# Patient Record
Sex: FEMALE | Race: OTHER
[De-identification: names, ages, dates, MRNs, and addresses within clinical notes are randomized per-mention and may not be internally consistent; named-entity substitution may affect disease eponyms.]

---

## 2017-04-18 ENCOUNTER — TRANSCRIPTION ENCOUNTER (OUTPATIENT)
Age: 37
End: 2017-04-18

## 2017-10-19 ENCOUNTER — EMERGENCY (EMERGENCY)
Facility: HOSPITAL | Age: 37
LOS: 1 days | Discharge: HOME | End: 2017-10-19
Attending: EMERGENCY MEDICINE
Payer: MEDICAID

## 2017-10-19 VITALS
RESPIRATION RATE: 16 BRPM | OXYGEN SATURATION: 96 % | HEART RATE: 71 BPM | DIASTOLIC BLOOD PRESSURE: 80 MMHG | WEIGHT: 151.9 LBS | HEIGHT: 62 IN | SYSTOLIC BLOOD PRESSURE: 150 MMHG | TEMPERATURE: 98 F

## 2017-10-19 DIAGNOSIS — Z88.0 ALLERGY STATUS TO PENICILLIN: ICD-10-CM

## 2017-10-19 DIAGNOSIS — T31.0 BURNS INVOLVING LESS THAN 10% OF BODY SURFACE: ICD-10-CM
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
## 2017-10-19 DIAGNOSIS — T22.112A BURN OF FIRST DEGREE OF LEFT FOREARM, INITIAL ENCOUNTER: ICD-10-CM

## 2017-10-19 PROCEDURE — 99284 EMERGENCY DEPT VISIT MOD MDM: CPT

## 2017-10-19 PROCEDURE — 99282 EMERGENCY DEPT VISIT SF MDM: CPT

## 2017-10-19 NOTE — ED PROVIDER NOTE - OBJECTIVE STATEMENT
Pt. present to ED c/o burn to her left forearm and upper arm about 1 hour ago. Pt. states that cooking oil splashed on her arm when she was cooking. This occurred about 1 hour PTA. NO other injuries. Pt. has mild pain to her left forearm. NO blisters.

## 2017-10-19 NOTE — ED ADULT NURSE NOTE - OBJECTIVE STATEMENT
pt from home c/o of Lt forearm splashed by oil while cooking Lt forearm scattered redness noted no blisters skin dry and intact

## 2017-10-19 NOTE — ED PROVIDER NOTE - SKIN LOCATION #1
forearm/Multiple erythematous patches and papular lesions noted to Left forearm and some smaller raised lesions to Left upper arm. No blisters noted.  NO skin tenting.

## 2017-11-02 ENCOUNTER — EMERGENCY (EMERGENCY)
Facility: HOSPITAL | Age: 37
LOS: 1 days | Discharge: HOME | End: 2017-11-02
Attending: EMERGENCY MEDICINE | Admitting: EMERGENCY MEDICINE
Payer: MEDICAID

## 2017-11-02 VITALS
HEART RATE: 82 BPM | OXYGEN SATURATION: 100 % | TEMPERATURE: 98 F | RESPIRATION RATE: 16 BRPM | DIASTOLIC BLOOD PRESSURE: 82 MMHG | SYSTOLIC BLOOD PRESSURE: 147 MMHG

## 2017-11-02 PROCEDURE — 99284 EMERGENCY DEPT VISIT MOD MDM: CPT

## 2017-11-02 NOTE — ED ADULT TRIAGE NOTE - CHIEF COMPLAINT QUOTE
Sent by PMD for evaluation after pt began having abd pain and BRBPR since yesterday. Pt states episodes occur every 3 hours, with clots. Denies blood thinner use. Also states vomiting yesterday but denies any today. Denies medical hx. Denies weakness or dizziness.

## 2017-11-03 VITALS
TEMPERATURE: 98 F | OXYGEN SATURATION: 100 % | DIASTOLIC BLOOD PRESSURE: 79 MMHG | HEART RATE: 79 BPM | SYSTOLIC BLOOD PRESSURE: 134 MMHG | RESPIRATION RATE: 16 BRPM

## 2017-11-03 LAB
ALBUMIN SERPL ELPH-MCNC: 4.4 G/DL — SIGNIFICANT CHANGE UP (ref 3.3–5)
ALP SERPL-CCNC: 72 U/L — SIGNIFICANT CHANGE UP (ref 40–120)
ALT FLD-CCNC: 10 U/L — SIGNIFICANT CHANGE UP (ref 4–33)
APTT BLD: 31.4 SEC — SIGNIFICANT CHANGE UP (ref 27.5–37.4)
AST SERPL-CCNC: 19 U/L — SIGNIFICANT CHANGE UP (ref 4–32)
BASOPHILS # BLD AUTO: 0.05 K/UL — SIGNIFICANT CHANGE UP (ref 0–0.2)
BASOPHILS NFR BLD AUTO: 0.8 % — SIGNIFICANT CHANGE UP (ref 0–2)
BILIRUB SERPL-MCNC: 0.5 MG/DL — SIGNIFICANT CHANGE UP (ref 0.2–1.2)
BLD GP AB SCN SERPL QL: NEGATIVE — SIGNIFICANT CHANGE UP
BUN SERPL-MCNC: 9 MG/DL — SIGNIFICANT CHANGE UP (ref 7–23)
CALCIUM SERPL-MCNC: 9.3 MG/DL — SIGNIFICANT CHANGE UP (ref 8.4–10.5)
CHLORIDE SERPL-SCNC: 102 MMOL/L — SIGNIFICANT CHANGE UP (ref 98–107)
CO2 SERPL-SCNC: 28 MMOL/L — SIGNIFICANT CHANGE UP (ref 22–31)
CREAT SERPL-MCNC: 0.74 MG/DL — SIGNIFICANT CHANGE UP (ref 0.5–1.3)
EOSINOPHIL # BLD AUTO: 0.09 K/UL — SIGNIFICANT CHANGE UP (ref 0–0.5)
EOSINOPHIL NFR BLD AUTO: 1.4 % — SIGNIFICANT CHANGE UP (ref 0–6)
GLUCOSE SERPL-MCNC: 100 MG/DL — HIGH (ref 70–99)
HCG UR-SCNC: NEGATIVE — SIGNIFICANT CHANGE UP
HCT VFR BLD CALC: 40.7 % — SIGNIFICANT CHANGE UP (ref 34.5–45)
HGB BLD-MCNC: 13.2 G/DL — SIGNIFICANT CHANGE UP (ref 11.5–15.5)
IMM GRANULOCYTES # BLD AUTO: 0.01 # — SIGNIFICANT CHANGE UP
IMM GRANULOCYTES NFR BLD AUTO: 0.2 % — SIGNIFICANT CHANGE UP (ref 0–1.5)
INR BLD: 1.05 — SIGNIFICANT CHANGE UP (ref 0.88–1.17)
LYMPHOCYTES # BLD AUTO: 2.62 K/UL — SIGNIFICANT CHANGE UP (ref 1–3.3)
LYMPHOCYTES # BLD AUTO: 40.4 % — SIGNIFICANT CHANGE UP (ref 13–44)
MCHC RBC-ENTMCNC: 31.1 PG — SIGNIFICANT CHANGE UP (ref 27–34)
MCHC RBC-ENTMCNC: 32.4 % — SIGNIFICANT CHANGE UP (ref 32–36)
MCV RBC AUTO: 95.8 FL — SIGNIFICANT CHANGE UP (ref 80–100)
MONOCYTES # BLD AUTO: 0.74 K/UL — SIGNIFICANT CHANGE UP (ref 0–0.9)
MONOCYTES NFR BLD AUTO: 11.4 % — SIGNIFICANT CHANGE UP (ref 2–14)
NEUTROPHILS # BLD AUTO: 2.97 K/UL — SIGNIFICANT CHANGE UP (ref 1.8–7.4)
NEUTROPHILS NFR BLD AUTO: 45.8 % — SIGNIFICANT CHANGE UP (ref 43–77)
NRBC # FLD: 0 — SIGNIFICANT CHANGE UP
PLATELET # BLD AUTO: 286 K/UL — SIGNIFICANT CHANGE UP (ref 150–400)
PMV BLD: 9.4 FL — SIGNIFICANT CHANGE UP (ref 7–13)
POTASSIUM SERPL-MCNC: 4 MMOL/L — SIGNIFICANT CHANGE UP (ref 3.5–5.3)
POTASSIUM SERPL-SCNC: 4 MMOL/L — SIGNIFICANT CHANGE UP (ref 3.5–5.3)
PROT SERPL-MCNC: 8 G/DL — SIGNIFICANT CHANGE UP (ref 6–8.3)
PROTHROM AB SERPL-ACNC: 11.8 SEC — SIGNIFICANT CHANGE UP (ref 9.8–13.1)
RBC # BLD: 4.25 M/UL — SIGNIFICANT CHANGE UP (ref 3.8–5.2)
RBC # FLD: 12.2 % — SIGNIFICANT CHANGE UP (ref 10.3–14.5)
RH IG SCN BLD-IMP: NEGATIVE — SIGNIFICANT CHANGE UP
SODIUM SERPL-SCNC: 141 MMOL/L — SIGNIFICANT CHANGE UP (ref 135–145)
SP GR UR: 1.03 — SIGNIFICANT CHANGE UP (ref 1–1.03)
WBC # BLD: 6.48 K/UL — SIGNIFICANT CHANGE UP (ref 3.8–10.5)
WBC # FLD AUTO: 6.48 K/UL — SIGNIFICANT CHANGE UP (ref 3.8–10.5)

## 2017-11-03 NOTE — ED PROVIDER NOTE - ATTENDING CONTRIBUTION TO CARE
I was physically present for the E/M service provided. I agree with above history, physical, and plan which I have reviewed and edited where appropriate. I was physically present for the key portions of the service provided.    Patient is a 37 year old female with PMHx of GERD p/w lower abdominal pain x1 day and one episode of bloody formed stool PTA. +Nausea. Afebrile. I was physically present for the E/M service provided. I agree with above history, physical, and plan which I have reviewed and edited where appropriate. I was physically present for the key portions of the service provided.    Patient is a 37 year old female with PMHx of GERD p/w lower abdominal pain x1 day and one episode of bloody formed stool PTA. +Nausea. Afebrile. VS Stable in ED. H&H WNL. No blood on rectal exam. f/u GI outpt.

## 2017-11-03 NOTE — ED PROVIDER NOTE - OBJECTIVE STATEMENT
Patient is a 37 year old female with PMHx of GERD, she has been having lower abdominal pain since yesterday and 1 episode of finding blood in her toilet bowel tonight around midnight. She has a photograph on her phone that shows bright red blood in her toilet bowel. She denies any dark stool or emesis. She also denies prior history of anything like this, She has associated nausea with 2 episodes of vomiting yesterday. She has been feeling weak but she attributes that to decreased PO intake from the nausea. She denies any CP, SOB, fatigue or LOC. She does admit to some mild pain with defecations, she says that she feels a pain or burning after she finishes.

## 2017-11-03 NOTE — ED PROVIDER NOTE - MEDICAL DECISION MAKING DETAILS
Patient is a healthy 37 year old female who presents with 1 episode of rectal bleeding associated with some lower abdominal pain. DDx: Diverticulitis, IBS, inflammatory bowel disorder, hemorrhoids, rectal fissure.

## 2017-11-03 NOTE — ED ADULT NURSE NOTE - OBJECTIVE STATEMENT
Ambulatory complaining of BRBPR since yesterday afternoon with intermittent abdominal pain and episodes of vomiting yesterday. Denies N/V at present. States she uses the bathroom every couple of hours, the maximum time between is 3 hours. Patient is calm and cooperative. Denies fevers/chills, travel, eating new foods, change in diet, anticoagulation. Waiting to be seen by provider. Safety maintained, needs attended, will continue to monitor.

## 2018-08-13 ENCOUNTER — APPOINTMENT (OUTPATIENT)
Dept: ORTHOPEDIC SURGERY | Facility: CLINIC | Age: 38
End: 2018-08-13
Payer: MEDICAID

## 2018-08-13 VITALS — HEART RATE: 84 BPM | SYSTOLIC BLOOD PRESSURE: 130 MMHG | DIASTOLIC BLOOD PRESSURE: 84 MMHG

## 2018-08-13 DIAGNOSIS — M54.5 LOW BACK PAIN: ICD-10-CM

## 2018-08-13 DIAGNOSIS — M54.16 RADICULOPATHY, LUMBAR REGION: ICD-10-CM

## 2018-08-13 PROCEDURE — 72100 X-RAY EXAM L-S SPINE 2/3 VWS: CPT

## 2018-08-13 PROCEDURE — 99204 OFFICE O/P NEW MOD 45 MIN: CPT

## 2018-08-13 RX ORDER — METHYLPREDNISOLONE 4 MG/1
4 TABLET ORAL
Qty: 1 | Refills: 1 | Status: ACTIVE | COMMUNITY
Start: 2018-08-13 | End: 1900-01-01

## 2018-08-21 ENCOUNTER — APPOINTMENT (OUTPATIENT)
Dept: ENDOCRINOLOGY | Facility: CLINIC | Age: 38
End: 2018-08-21
Payer: MEDICAID

## 2018-08-21 VITALS
OXYGEN SATURATION: 100 % | WEIGHT: 160 LBS | HEIGHT: 62 IN | SYSTOLIC BLOOD PRESSURE: 125 MMHG | DIASTOLIC BLOOD PRESSURE: 68 MMHG | TEMPERATURE: 98.7 F | HEART RATE: 79 BPM | BODY MASS INDEX: 29.44 KG/M2 | RESPIRATION RATE: 16 BRPM

## 2018-08-21 DIAGNOSIS — R73.03 PREDIABETES.: ICD-10-CM

## 2018-08-21 DIAGNOSIS — E78.5 HYPERLIPIDEMIA, UNSPECIFIED: ICD-10-CM

## 2018-08-21 DIAGNOSIS — E66.9 OBESITY, UNSPECIFIED: ICD-10-CM

## 2018-08-21 PROCEDURE — 99214 OFFICE O/P EST MOD 30 MIN: CPT

## 2018-09-07 ENCOUNTER — APPOINTMENT (OUTPATIENT)
Dept: NEUROLOGY | Facility: CLINIC | Age: 38
End: 2018-09-07

## 2018-09-10 ENCOUNTER — APPOINTMENT (OUTPATIENT)
Dept: NUTRITION | Facility: CLINIC | Age: 38
End: 2018-09-10
Payer: MEDICAID

## 2018-09-10 PROCEDURE — 97802 MEDICAL NUTRITION INDIV IN: CPT

## 2019-05-30 ENCOUNTER — EMERGENCY (EMERGENCY)
Facility: HOSPITAL | Age: 39
LOS: 1 days | Discharge: HOME | End: 2019-05-30
Attending: EMERGENCY MEDICINE | Admitting: EMERGENCY MEDICINE
Payer: MEDICAID

## 2019-05-30 VITALS
TEMPERATURE: 100 F | OXYGEN SATURATION: 99 % | HEART RATE: 78 BPM | RESPIRATION RATE: 18 BRPM | DIASTOLIC BLOOD PRESSURE: 50 MMHG | SYSTOLIC BLOOD PRESSURE: 102 MMHG

## 2019-05-30 VITALS
SYSTOLIC BLOOD PRESSURE: 146 MMHG | TEMPERATURE: 98 F | OXYGEN SATURATION: 100 % | RESPIRATION RATE: 16 BRPM | DIASTOLIC BLOOD PRESSURE: 81 MMHG | HEART RATE: 80 BPM

## 2019-05-30 PROCEDURE — 99284 EMERGENCY DEPT VISIT MOD MDM: CPT | Mod: 25

## 2019-05-30 RX ORDER — KETOROLAC TROMETHAMINE 30 MG/ML
15 SYRINGE (ML) INJECTION ONCE
Refills: 0 | Status: DISCONTINUED | OUTPATIENT
Start: 2019-05-30 | End: 2019-05-30

## 2019-05-30 RX ORDER — SODIUM CHLORIDE 9 MG/ML
1000 INJECTION INTRAMUSCULAR; INTRAVENOUS; SUBCUTANEOUS ONCE
Refills: 0 | Status: COMPLETED | OUTPATIENT
Start: 2019-05-30 | End: 2019-05-30

## 2019-05-30 RX ORDER — ACETAMINOPHEN 500 MG
975 TABLET ORAL ONCE
Refills: 0 | Status: COMPLETED | OUTPATIENT
Start: 2019-05-30 | End: 2019-05-30

## 2019-05-30 RX ORDER — METOCLOPRAMIDE HCL 10 MG
10 TABLET ORAL ONCE
Refills: 0 | Status: COMPLETED | OUTPATIENT
Start: 2019-05-30 | End: 2019-05-30

## 2019-05-30 RX ADMIN — Medication 975 MILLIGRAM(S): at 02:55

## 2019-05-30 RX ADMIN — Medication 15 MILLIGRAM(S): at 05:13

## 2019-05-30 RX ADMIN — SODIUM CHLORIDE 2000 MILLILITER(S): 9 INJECTION INTRAMUSCULAR; INTRAVENOUS; SUBCUTANEOUS at 02:55

## 2019-05-30 RX ADMIN — Medication 10 MILLIGRAM(S): at 02:55

## 2019-05-30 RX ADMIN — Medication 15 MILLIGRAM(S): at 04:34

## 2019-05-30 NOTE — ED ADULT TRIAGE NOTE - CHIEF COMPLAINT QUOTE
Pt c/o headache since yesterday morning. Also c/o dizziness. Denies n/v, blurry vision. Took motrin 4 hrs ago with no relief. PMHx htn. Pt c/o headache since yesterday morning. Also c/o dizziness PTA. Denies n/v, blurry vision. Took motrin 4 hrs ago with no relief. FS 83. PMHx htn. Pt c/o headache since yesterday morning. Also c/o dizziness PTA. Denies n/v, blurry vision. Took motrin 4 hrs ago with no relief. FS 83. JACLYN called for stroke eval, no stroke code called. PMHx htn.

## 2019-05-30 NOTE — ED PROVIDER NOTE - PHYSICAL EXAMINATION
no LE edema, normal equal distal pulses.   PERRL, EOMI, no nystagmus. CN intact. Strength 5/5. Steady unassisted gait. No pronator drift. Sensation intact. No carotid bruits.

## 2019-05-30 NOTE — ED PROVIDER NOTE - ATTENDING CONTRIBUTION TO CARE
38F PMH HTN p/w bitemporal HA, x1d, intermittent, similar to multiple prior, no other systemic symptoms. +recent MRI head reportedly normal. Vitals wnl, exam as above.   ddx: Clinically benign HA.  Symptom control, reaswess.

## 2019-05-30 NOTE — ED ADULT NURSE REASSESSMENT NOTE - NS ED NURSE REASSESS COMMENT FT1
Pt denies any complaints at this time. respirations are equal and unlabored, no distress noted. Vitals as noted. IV removed, discharge paperwork given. Pt stable, ambulatory out of the ED with family.

## 2019-05-30 NOTE — ED ADULT NURSE NOTE - NSIMPLEMENTINTERV_GEN_ALL_ED
Implemented All Universal Safety Interventions:  [place] to call system. Call bell, personal items and telephone within reach. Instruct patient to call for assistance. Room bathroom lighting operational. Non-slip footwear when patient is off stretcher. Physically safe environment: no spills, clutter or unnecessary equipment. Stretcher in lowest position, wheels locked, appropriate side rails in place.

## 2019-05-30 NOTE — ED ADULT NURSE NOTE - OBJECTIVE STATEMENT
Pt received in room 29, AA&OX4. Pt c/o headache since 7Am this morning, pt reports taking 1 Ibuprofen at home with no relief. Pt reports dizziness early that is now resolved. Pt denies vision changes, blurry vision, SOB, CP, nausea, vomiting, diarrhea at this time. MD at bedside for evaluation, awaiting orders. Will continue to monitor.

## 2019-05-30 NOTE — ED PROVIDER NOTE - PROGRESS NOTE DETAILS
Raysa: Patient resting comfortably stating headache much improved. Currently 3/10 [place]: Feeling much better, comfortable for dc outpt pmd/neuro f/u.

## 2019-05-30 NOTE — ED ADULT NURSE NOTE - CHIEF COMPLAINT QUOTE
Pt c/o headache since yesterday morning. Also c/o dizziness PTA. Denies n/v, blurry vision. Took motrin 4 hrs ago with no relief. FS 83. JACLYN called for stroke eval, no stroke code called. PMHx htn.

## 2019-05-30 NOTE — ED PROVIDER NOTE - OBJECTIVE STATEMENT
38F with pmh HTN presenting with bilateral band like headache since this morning 7/10 and worsening throughout day. States that she has been getting similar headaches chronically, seen by neurologist and received MR brain 1 month ago which was normal per patient. Tried taking motrin with no relief. No fever, chills, cp, sob, n/v/d, dysuria, cough, recent illness, changes in vision 38F with pmh HTN presenting with bilateral band like headache since this morning 7/10 and worsening throughout day. States that she has been getting similar headaches chronically, seen by neurologist and received MR brain 1 month ago which was normal per patient. Tried taking motrin with no relief. No fever, chills, cp, sob, n/v/d, dysuria, cough, recent illness, changes in vision  [place]: 38F PMH HTN p/w bitemporal HA, x1d, intermittent. 1tab motrin ~4hrs PTA w/ minimal relief. HAs hx of multiple similar Has, was evaluated by neuro and had reportedly normal MRI. While getting out of car to ED had very brief lightheadness w/ standing that resolved after a few seconds, no other dizziness. Denies vision changes, weakness/numbness, rashes, tinnitus, hearing changes, URI symptoms, f/c, SOB/CP, NVD, abd pain, urinary complaints, black/bloody stool.

## 2019-05-30 NOTE — ED PROVIDER NOTE - CLINICAL SUMMARY MEDICAL DECISION MAKING FREE TEXT BOX
Patient presenting with headache. Likely benign variant, possible tension. Hx of similar chronic headaches with recent eval by neuro outpatient with negative MR. Neurologically intact, Will treat symptomatically

## 2022-08-04 NOTE — ED ADULT NURSE NOTE - SKIN CAPILLARY REFILL
PAST MEDICAL HISTORY:  GERD (gastroesophageal reflux disease)     History of Transient Ischemic Attack 2 times, > 20 years ago    Hypercholesterolemia patient reports resloved    OA (osteoarthritis)     Obese     OLEGARIO (obstructive sleep apnea) does not use device    Unspecified B-cell lymphoma, unspecified site     
2 seconds or less

## 2022-12-04 NOTE — ED ADULT NURSE NOTE - PAIN RATING/NUMBER SCALE (0-10): ACTIVITY
Follow-up with orthopedics or your primary care doctor. They will be able to obtain an outpatient MRI if needed. Return to the ED for worsening symptoms or for other concerns.
5

## 2023-08-03 ENCOUNTER — APPOINTMENT (OUTPATIENT)
Dept: PAIN MANAGEMENT | Facility: CLINIC | Age: 43
End: 2023-08-03
Payer: MEDICAID

## 2023-08-03 PROCEDURE — 99204 OFFICE O/P NEW MOD 45 MIN: CPT

## 2023-08-03 RX ORDER — MELOXICAM 15 MG/1
15 TABLET ORAL
Qty: 30 | Refills: 2 | Status: ACTIVE | COMMUNITY
Start: 2023-08-03 | End: 1900-01-01

## 2023-08-03 NOTE — DATA REVIEWED
[MRI] : MRI [Lumbar Spine] : lumbar spine [Report was reviewed and noted in the chart] : The report was reviewed and noted in the chart [I independently reviewed and interpreted images and report] : I independently reviewed and interpreted images and report [FreeTextEntry1] : 4/25/2019: Mild degenerative disc changes at L2-L3 through L4-L5, including L3-L4 right foraminal annular fissure. No significant spinal canal or foraminal stenosis.

## 2023-08-03 NOTE — DISCUSSION/SUMMARY
[de-identified] : MARGARITA LACY is a 42 year year-old woman presenting for a NPV for a history of chronic low back pain.  Prior treatment: steroid injection, type uncertain acetaminophen  physical therapy acupuncture  Plan: 1) MRI lumbar spine; patient with RLE radiculopathy that is new, no findings on old MRI to explain symptoms 2) Start physical therapy 3) Trial meloxicam 15mg daily 4) RTC 2 weeks to review MRI
alone

## 2023-08-03 NOTE — PHYSICAL EXAM
[de-identified] : Gen: NAD, patient is obese Head: NC/AT Eyes: no glasses, no scleral icterus ENT: mucous membranes moist CV: RRR, S1 S2, no mrg Lungs: CTAB, nonlabored breathing Abd: soft, NT/ND Ext: full ROM in all extremities, no peripheral edema Back: +TTP in the bilateral low lumbar facet region; +SLR on the right, limited extension 2/2 pain Neuro: CN intact LEs +5 L +4 R hip flexion +5 L +4 R leg extension +5 L +5 R leg flexion +5 L +5 R foot dorsiflexion +5 L +5 R foot plantarflexion +5 L +5 R EHL extension Psych: normal affect Skin: no visible lesions

## 2023-08-03 NOTE — HISTORY OF PRESENT ILLNESS
[FreeTextEntry1] : 8/3/2023: MARGARITA LACY is a 42 year year-old woman presenting for a NPV for a history of chronic low back pain. The patient states that she has been having low back pain since 2015. She had a since steroid injection in the past, but is unsure as to which type. At this point, the patient endorses pain in the low lumbosacral region that radiates to the right anterolateral thigh and anterior leg. She notes occasional numbness and tingling and has difficulty with walking. No bowel or bladder incontinence or saddle anesthesia. She takes acetaminophen prn. Pain worse with standing and walking.    The patient states that average pain over the past week was 9/10 in severity.

## 2023-08-30 ENCOUNTER — APPOINTMENT (OUTPATIENT)
Dept: MRI IMAGING | Facility: CLINIC | Age: 43
End: 2023-08-30

## 2023-09-15 ENCOUNTER — APPOINTMENT (OUTPATIENT)
Dept: MRI IMAGING | Facility: CLINIC | Age: 43
End: 2023-09-15

## 2023-09-18 ENCOUNTER — APPOINTMENT (OUTPATIENT)
Dept: PAIN MANAGEMENT | Facility: CLINIC | Age: 43
End: 2023-09-18

## 2023-10-02 ENCOUNTER — APPOINTMENT (OUTPATIENT)
Dept: PAIN MANAGEMENT | Facility: CLINIC | Age: 43
End: 2023-10-02

## 2024-07-22 ENCOUNTER — APPOINTMENT (OUTPATIENT)
Dept: PAIN MANAGEMENT | Facility: CLINIC | Age: 44
End: 2024-07-22
Payer: MEDICAID

## 2024-07-22 VITALS — HEIGHT: 62 IN | WEIGHT: 175 LBS | BODY MASS INDEX: 32.2 KG/M2

## 2024-07-22 PROBLEM — M54.50 LUMBAGO: Status: ACTIVE | Noted: 2018-08-13

## 2024-07-22 PROCEDURE — 99214 OFFICE O/P EST MOD 30 MIN: CPT

## 2024-07-22 NOTE — HISTORY OF PRESENT ILLNESS
[Lower back] : lower back [Buttock] : buttock [Right Leg] : right leg [7] : 7 [6] : 6 [Dull/Aching] : dull/aching [Radiating] : radiating [Sharp] : sharp [Stabbing] : stabbing [Tingling] : tingling [Intermittent] : intermittent [Household chores] : household chores [Leisure] : leisure [Nothing helps with pain getting better] : Nothing helps with pain getting better [Sitting] : sitting [Standing] : standing [Walking] : walking [Bending forward] : bending forward [Physical therapy] : physical therapy [FreeTextEntry1] : 7/22/2024 MARGARITA LACY is a 43 year-old woman presenting for a RPV for a history of chronic low back pain. The patient notes ongoing pain across the low back. She notes that she has been having radiation of pain to the right posterolateral thigh and lateral leg to the dorsal foot on the right side. Notes numbness and weakness with right hip flexion and right foot dorsiflexion. Has occasional difficulty with ambulation due to weakness.  The patient states that average pain over the past week was 7/10 in severity. Mood: Patient notes some depressed mood. No anxiety.  Sleep: Patient notes occasional difficulty with sleep 2/2 pain.   8/3/2023: MARGARITA LACY is a 42 year year-old woman presenting for a NPV for a history of chronic low back pain. The patient states that she has been having low back pain since 2015. She had a since steroid injection in the past, but is unsure as to which type. At this point, the patient endorses pain in the low lumbosacral region that radiates to the right anterolateral thigh and anterior leg. She notes occasional numbness and tingling and has difficulty with walking. No bowel or bladder incontinence or saddle anesthesia. She takes acetaminophen prn. Pain worse with standing and walking.  The patient states that average pain over the past week was 9/10 in severity. [] : no [FreeTextEntry6] : numbness and tingling in R feet  [FreeTextEntry7] : R leg  [de-identified] : lifting  [de-identified] : x-ray and MRI  ([facility])

## 2024-07-22 NOTE — PHYSICAL EXAM
[de-identified] : Gen: NAD, patient is obese Head: NC/AT Eyes: no glasses, no scleral icterus ENT: mucous membranes moist CV: RRR, S1 S2, no mrg Lungs: CTAB, nonlabored breathing Abd: soft, NT/ND Ext: full ROM in all extremities, no peripheral edema Back: +TTP in the bilateral low lumbar facet region; +SLR on the right, limited extension 2/2 pain Neuro: CN intact; decreased sensation over L5 on the right LEs +5 L +4 R hip flexion +5 L +4 R leg extension +5 L +5 R leg flexion +5 L +4 R foot dorsiflexion +5 L +5 R foot plantarflexion +5 L +5 R EHL extension Psych: normal affect Skin: no visible lesions

## 2024-07-22 NOTE — DISCUSSION/SUMMARY
[de-identified] : MARGARITA LACY is a 43 year year-old woman presenting for a RPV for a history of chronic low back pain.  Prior treatment: Physical therapy steroid injection, type uncertain acetaminophen  physical therapy acupuncture  Plan: 1) Obtain MRI lumbar spine w/o contrast 2) Restart physical therapy--script provided 3) Consider RIGHT-sided TFESI based on the imaging above 4) Continue meloxicam 15mg daily; denies AEs 5) RTC 2 weeks to review MRI

## 2024-07-29 ENCOUNTER — APPOINTMENT (OUTPATIENT)
Dept: MRI IMAGING | Facility: CLINIC | Age: 44
End: 2024-07-29
Payer: MEDICAID

## 2024-07-29 PROCEDURE — 72148 MRI LUMBAR SPINE W/O DYE: CPT

## 2024-08-05 ENCOUNTER — APPOINTMENT (OUTPATIENT)
Dept: PAIN MANAGEMENT | Facility: CLINIC | Age: 44
End: 2024-08-05

## 2024-08-05 PROCEDURE — 99214 OFFICE O/P EST MOD 30 MIN: CPT

## 2024-08-05 NOTE — HISTORY OF PRESENT ILLNESS
[Lower back] : lower back [Buttock] : buttock [Right Leg] : right leg [6] : 6 [Dull/Aching] : dull/aching [Radiating] : radiating [Sharp] : sharp [Stabbing] : stabbing [Tingling] : tingling [Intermittent] : intermittent [Household chores] : household chores [Leisure] : leisure [Nothing helps with pain getting better] : Nothing helps with pain getting better [Sitting] : sitting [Standing] : standing [Walking] : walking [Bending forward] : bending forward [Physical therapy] : physical therapy [5] : 5 [7] : 7 [FreeTextEntry1] : 8/5/2024 MARGARITA LACY is a 43 year-old woman presenting for a RPV for a history of chronic low back pain. The patient notes that she continues to have an aching pain in the right low back with radiation to the right posterolateral thigh and lateral leg. She notes intermittent tingling and numbness and some RLE weakness. The patient notes having ongoing difficulty with ambulation.  The patient states that average pain over the past week was 6/10 in severity. Mood: Patient notes some depressed mood. No anxiety.  Sleep: Patient notes occasional difficulty with sleep 2/2 pain.   7/22/2024 MARGARITA LACY is a 43 year-old woman presenting for a RPV for a history of chronic low back pain. The patient notes ongoing pain across the low back. She notes that she has been having radiation of pain to the right posterolateral thigh and lateral leg to the dorsal foot on the right side. Notes numbness and weakness with right hip flexion and right foot dorsiflexion. Has occasional difficulty with ambulation due to weakness.  The patient states that average pain over the past week was 7/10 in severity. Mood: Patient notes some depressed mood. No anxiety.  Sleep: Patient notes occasional difficulty with sleep 2/2 pain.   8/3/2023: MARGARITA LACY is a 42 year year-old woman presenting for a NPV for a history of chronic low back pain. The patient states that she has been having low back pain since 2015. She had a since steroid injection in the past, but is unsure as to which type. At this point, the patient endorses pain in the low lumbosacral region that radiates to the right anterolateral thigh and anterior leg. She notes occasional numbness and tingling and has difficulty with walking. No bowel or bladder incontinence or saddle anesthesia. She takes acetaminophen prn. Pain worse with standing and walking.  The patient states that average pain over the past week was 9/10 in severity.  [] : no [FreeTextEntry6] : numbness and tingling in R feet  [FreeTextEntry7] : R leg  [de-identified] : lifting  [de-identified] : x-ray and MRI  ([facility])

## 2024-08-05 NOTE — PHYSICAL EXAM
[de-identified] : Gen: NAD, patient is obese Head: NC/AT Eyes: no glasses, no scleral icterus ENT: mucous membranes moist CV: RRR, S1 S2, no mrg Lungs: CTAB, nonlabored breathing Abd: soft, NT/ND Ext: full ROM in all extremities, no peripheral edema Back: +TTP in the bilateral low lumbar facet region; +SLR on the right, limited extension 2/2 pain Neuro: CN intact; decreased sensation over L5 on the right LEs +5 L +4 R hip flexion +5 L +4 R leg extension +5 L +5 R leg flexion +5 L +4 R foot dorsiflexion +5 L +5 R foot plantarflexion +5 L +5 R EHL extension Psych: normal affect Skin: no visible lesions

## 2024-08-05 NOTE — PHYSICAL EXAM
[de-identified] : Gen: NAD, patient is obese Head: NC/AT Eyes: no glasses, no scleral icterus ENT: mucous membranes moist CV: RRR, S1 S2, no mrg Lungs: CTAB, nonlabored breathing Abd: soft, NT/ND Ext: full ROM in all extremities, no peripheral edema Back: +TTP in the bilateral low lumbar facet region; +SLR on the right, limited extension 2/2 pain Neuro: CN intact; decreased sensation over L5 on the right LEs +5 L +4 R hip flexion +5 L +4 R leg extension +5 L +5 R leg flexion +5 L +4 R foot dorsiflexion +5 L +5 R foot plantarflexion +5 L +5 R EHL extension Psych: normal affect Skin: no visible lesions

## 2024-08-05 NOTE — HISTORY OF PRESENT ILLNESS
[Lower back] : lower back [Buttock] : buttock [Right Leg] : right leg [6] : 6 [Dull/Aching] : dull/aching [Radiating] : radiating [Sharp] : sharp [Stabbing] : stabbing [Tingling] : tingling [Intermittent] : intermittent [Household chores] : household chores [Leisure] : leisure [Nothing helps with pain getting better] : Nothing helps with pain getting better [Sitting] : sitting [Standing] : standing [Walking] : walking [Bending forward] : bending forward [Physical therapy] : physical therapy [5] : 5 [7] : 7 [FreeTextEntry1] : 8/5/2024 MARGARITA LACY is a 43 year-old woman presenting for a RPV for a history of chronic low back pain. The patient notes that she continues to have an aching pain in the right low back with radiation to the right posterolateral thigh and lateral leg. She notes intermittent tingling and numbness and some RLE weakness. The patient notes having ongoing difficulty with ambulation.  The patient states that average pain over the past week was 6/10 in severity. Mood: Patient notes some depressed mood. No anxiety.  Sleep: Patient notes occasional difficulty with sleep 2/2 pain.   7/22/2024 MARGARITA LACY is a 43 year-old woman presenting for a RPV for a history of chronic low back pain. The patient notes ongoing pain across the low back. She notes that she has been having radiation of pain to the right posterolateral thigh and lateral leg to the dorsal foot on the right side. Notes numbness and weakness with right hip flexion and right foot dorsiflexion. Has occasional difficulty with ambulation due to weakness.  The patient states that average pain over the past week was 7/10 in severity. Mood: Patient notes some depressed mood. No anxiety.  Sleep: Patient notes occasional difficulty with sleep 2/2 pain.   8/3/2023: MARGARITA LACY is a 42 year year-old woman presenting for a NPV for a history of chronic low back pain. The patient states that she has been having low back pain since 2015. She had a since steroid injection in the past, but is unsure as to which type. At this point, the patient endorses pain in the low lumbosacral region that radiates to the right anterolateral thigh and anterior leg. She notes occasional numbness and tingling and has difficulty with walking. No bowel or bladder incontinence or saddle anesthesia. She takes acetaminophen prn. Pain worse with standing and walking.  The patient states that average pain over the past week was 9/10 in severity.  [] : no [FreeTextEntry6] : numbness and tingling in R feet  [FreeTextEntry7] : R leg  [de-identified] : lifting  [de-identified] : x-ray and MRI  ([facility])

## 2024-08-05 NOTE — DISCUSSION/SUMMARY
[de-identified] : MARGARITA LACY is a 43 year year-old woman presenting for a RPV for a history of chronic low back pain.  Prior treatment: Physical therapy steroid injection, type uncertain acetaminophen  physical therapy acupuncture  Plan: 1) MRI lumbar spine images reviewed with the patient. 2) Discussed RIGHT L3-L4, L4-L5 TFESI (IN/IN) w/o MAC. The procedure was explained to the patient in detail. Reviewed risks, benefits, and alternatives with the patient. Some risks discussed included temporary increase in pain, bleeding, infection, and side effects from steroids. The patient expressed understanding and would like to defer. 3) Patient to start acupuncture  4) Continue physical therapy 5) Continue meloxicam 15mg daily; denies AEs 6) RTC prn

## 2024-08-05 NOTE — DISCUSSION/SUMMARY
[de-identified] : MARGARITA LACY is a 43 year year-old woman presenting for a RPV for a history of chronic low back pain.  Prior treatment: Physical therapy steroid injection, type uncertain acetaminophen  physical therapy acupuncture  Plan: 1) MRI lumbar spine images reviewed with the patient. 2) Discussed RIGHT L3-L4, L4-L5 TFESI (IN/IN) w/o MAC. The procedure was explained to the patient in detail. Reviewed risks, benefits, and alternatives with the patient. Some risks discussed included temporary increase in pain, bleeding, infection, and side effects from steroids. The patient expressed understanding and would like to defer. 3) Patient to start acupuncture  4) Continue physical therapy 5) Continue meloxicam 15mg daily; denies AEs 6) RTC prn

## 2024-08-05 NOTE — DATA REVIEWED
[MRI] : MRI [Lumbar Spine] : lumbar spine [Report was reviewed and noted in the chart] : The report was reviewed and noted in the chart [I independently reviewed and interpreted images and report] : I independently reviewed and interpreted images and report [FreeTextEntry1] : 7/29/2024 MRI Lumbar Spine O&C L2-L3: loss of disc signal; disc bulge impressing thecal sac w/ facet hypertrophy, ligamentum hypertrophy, facet effusion; inferior NF stenosis on the right w/ right foraminal annular fissure; mild central stenosis L3-L4: loss of disc signal; broad disc bulge, facet hypertrophy, ligamentum flavum hypertrophy w/ inferior RIGHT foraminal extension of bulge vs herniation and associated annular fissure; inferior RIGHT NF stenosis w/ no mass effect on nerve root; mild central stenosis L4-L5: broad disc bulge, facet hypertrophy, ligamentum flavum hypertrophy w/ proximal RIGHT foraminal herniation and proximal RIGHT NF stenosis; mild central stenosis L5-S1: facet hypertrophy  4/25/2019: Mild degenerative disc changes at L2-L3 through L4-L5, including L3-L4 right foraminal annular fissure. No significant spinal canal or foraminal stenosis.

## 2024-08-05 NOTE — DISCUSSION/SUMMARY
[de-identified] : MARGARITA LACY is a 43 year year-old woman presenting for a RPV for a history of chronic low back pain.  Prior treatment: Physical therapy steroid injection, type uncertain acetaminophen  physical therapy acupuncture  Plan: 1) MRI lumbar spine images reviewed with the patient. 2) Discussed RIGHT L3-L4, L4-L5 TFESI (IN/IN) w/o MAC. The procedure was explained to the patient in detail. Reviewed risks, benefits, and alternatives with the patient. Some risks discussed included temporary increase in pain, bleeding, infection, and side effects from steroids. The patient expressed understanding and would like to defer. 3) Patient to start acupuncture  4) Continue physical therapy 5) Continue meloxicam 15mg daily; denies AEs 6) RTC prn

## 2024-08-05 NOTE — HISTORY OF PRESENT ILLNESS
[Lower back] : lower back [Buttock] : buttock [Right Leg] : right leg [6] : 6 [Dull/Aching] : dull/aching [Radiating] : radiating [Sharp] : sharp [Stabbing] : stabbing [Tingling] : tingling [Intermittent] : intermittent [Household chores] : household chores [Leisure] : leisure [Nothing helps with pain getting better] : Nothing helps with pain getting better [Sitting] : sitting [Standing] : standing [Walking] : walking [Bending forward] : bending forward [Physical therapy] : physical therapy [5] : 5 [7] : 7 [FreeTextEntry1] : 8/5/2024 MARGARITA LACY is a 43 year-old woman presenting for a RPV for a history of chronic low back pain. The patient notes that she continues to have an aching pain in the right low back with radiation to the right posterolateral thigh and lateral leg. She notes intermittent tingling and numbness and some RLE weakness. The patient notes having ongoing difficulty with ambulation.  The patient states that average pain over the past week was 6/10 in severity. Mood: Patient notes some depressed mood. No anxiety.  Sleep: Patient notes occasional difficulty with sleep 2/2 pain.   7/22/2024 MARGARITA LACY is a 43 year-old woman presenting for a RPV for a history of chronic low back pain. The patient notes ongoing pain across the low back. She notes that she has been having radiation of pain to the right posterolateral thigh and lateral leg to the dorsal foot on the right side. Notes numbness and weakness with right hip flexion and right foot dorsiflexion. Has occasional difficulty with ambulation due to weakness.  The patient states that average pain over the past week was 7/10 in severity. Mood: Patient notes some depressed mood. No anxiety.  Sleep: Patient notes occasional difficulty with sleep 2/2 pain.   8/3/2023: MARGARITA LACY is a 42 year year-old woman presenting for a NPV for a history of chronic low back pain. The patient states that she has been having low back pain since 2015. She had a since steroid injection in the past, but is unsure as to which type. At this point, the patient endorses pain in the low lumbosacral region that radiates to the right anterolateral thigh and anterior leg. She notes occasional numbness and tingling and has difficulty with walking. No bowel or bladder incontinence or saddle anesthesia. She takes acetaminophen prn. Pain worse with standing and walking.  The patient states that average pain over the past week was 9/10 in severity.  [] : no [FreeTextEntry6] : numbness and tingling in R feet  [FreeTextEntry7] : R leg  [de-identified] : lifting  [de-identified] : x-ray and MRI  ([facility])

## 2024-08-05 NOTE — PHYSICAL EXAM
[de-identified] : Gen: NAD, patient is obese Head: NC/AT Eyes: no glasses, no scleral icterus ENT: mucous membranes moist CV: RRR, S1 S2, no mrg Lungs: CTAB, nonlabored breathing Abd: soft, NT/ND Ext: full ROM in all extremities, no peripheral edema Back: +TTP in the bilateral low lumbar facet region; +SLR on the right, limited extension 2/2 pain Neuro: CN intact; decreased sensation over L5 on the right LEs +5 L +4 R hip flexion +5 L +4 R leg extension +5 L +5 R leg flexion +5 L +4 R foot dorsiflexion +5 L +5 R foot plantarflexion +5 L +5 R EHL extension Psych: normal affect Skin: no visible lesions

## 2024-10-09 ENCOUNTER — LABORATORY RESULT (OUTPATIENT)
Age: 44
End: 2024-10-09

## 2024-10-09 ENCOUNTER — APPOINTMENT (OUTPATIENT)
Dept: OBGYN | Facility: CLINIC | Age: 44
End: 2024-10-09
Payer: MEDICAID

## 2024-10-09 ENCOUNTER — OUTPATIENT (OUTPATIENT)
Dept: OUTPATIENT SERVICES | Facility: HOSPITAL | Age: 44
LOS: 1 days | End: 2024-10-09
Payer: MEDICAID

## 2024-10-09 VITALS — BODY MASS INDEX: 32.01 KG/M2 | DIASTOLIC BLOOD PRESSURE: 84 MMHG | WEIGHT: 175 LBS | SYSTOLIC BLOOD PRESSURE: 124 MMHG

## 2024-10-09 DIAGNOSIS — Z12.4 ENCOUNTER FOR SCREENING FOR MALIGNANT NEOPLASM OF CERVIX: ICD-10-CM

## 2024-10-09 DIAGNOSIS — Z01.419 ENCOUNTER FOR GYNECOLOGICAL EXAMINATION (GENERAL) (ROUTINE) W/OUT ABNORMAL FINDINGS: ICD-10-CM

## 2024-10-09 DIAGNOSIS — N76.0 ACUTE VAGINITIS: ICD-10-CM

## 2024-10-09 DIAGNOSIS — Z01.419 ENCOUNTER FOR GYNECOLOGICAL EXAMINATION (GENERAL) (ROUTINE) WITHOUT ABNORMAL FINDINGS: ICD-10-CM

## 2024-10-09 DIAGNOSIS — R10.2 PELVIC AND PERINEAL PAIN: ICD-10-CM

## 2024-10-09 PROCEDURE — G0463: CPT

## 2024-10-09 PROCEDURE — 87591 N.GONORRHOEAE DNA AMP PROB: CPT

## 2024-10-09 PROCEDURE — 87491 CHLMYD TRACH DNA AMP PROBE: CPT

## 2024-10-09 PROCEDURE — 88175 CYTOPATH C/V AUTO FLUID REDO: CPT

## 2024-10-09 PROCEDURE — 87624 HPV HI-RISK TYP POOLED RSLT: CPT

## 2024-10-09 PROCEDURE — 99386 PREV VISIT NEW AGE 40-64: CPT

## 2024-10-10 LAB
C TRACH RRNA SPEC QL NAA+PROBE: SIGNIFICANT CHANGE UP
HPV HIGH+LOW RISK DNA PNL CVX: SIGNIFICANT CHANGE UP
N GONORRHOEA RRNA SPEC QL NAA+PROBE: SIGNIFICANT CHANGE UP
SPECIMEN SOURCE: SIGNIFICANT CHANGE UP

## 2024-10-14 LAB — CYTOLOGY SPEC DOC CYTO: SIGNIFICANT CHANGE UP

## 2024-10-26 ENCOUNTER — EMERGENCY (EMERGENCY)
Facility: HOSPITAL | Age: 44
LOS: 1 days | Discharge: HOME | End: 2024-10-26
Admitting: EMERGENCY MEDICINE
Payer: MEDICAID

## 2024-10-26 ENCOUNTER — NON-APPOINTMENT (OUTPATIENT)
Age: 44
End: 2024-10-26

## 2024-10-26 VITALS
RESPIRATION RATE: 16 BRPM | SYSTOLIC BLOOD PRESSURE: 150 MMHG | TEMPERATURE: 98 F | DIASTOLIC BLOOD PRESSURE: 76 MMHG | WEIGHT: 175.05 LBS | HEART RATE: 97 BPM | OXYGEN SATURATION: 100 % | HEIGHT: 63 IN

## 2024-10-26 LAB
BASOPHILS # BLD AUTO: 0.05 K/UL — SIGNIFICANT CHANGE UP (ref 0–0.2)
BASOPHILS NFR BLD AUTO: 0.8 % — SIGNIFICANT CHANGE UP (ref 0–2)
BLOOD GAS VENOUS COMPREHENSIVE RESULT: SIGNIFICANT CHANGE UP
EOSINOPHIL # BLD AUTO: 0.11 K/UL — SIGNIFICANT CHANGE UP (ref 0–0.5)
EOSINOPHIL NFR BLD AUTO: 1.8 % — SIGNIFICANT CHANGE UP (ref 0–6)
HCT VFR BLD CALC: 43.4 % — SIGNIFICANT CHANGE UP (ref 34.5–45)
HGB BLD-MCNC: 14.5 G/DL — SIGNIFICANT CHANGE UP (ref 11.5–15.5)
IANC: 3.22 K/UL — SIGNIFICANT CHANGE UP (ref 1.8–7.4)
IMM GRANULOCYTES NFR BLD AUTO: 0.3 % — SIGNIFICANT CHANGE UP (ref 0–0.9)
LYMPHOCYTES # BLD AUTO: 2.3 K/UL — SIGNIFICANT CHANGE UP (ref 1–3.3)
LYMPHOCYTES # BLD AUTO: 37 % — SIGNIFICANT CHANGE UP (ref 13–44)
MCHC RBC-ENTMCNC: 32.1 PG — SIGNIFICANT CHANGE UP (ref 27–34)
MCHC RBC-ENTMCNC: 33.4 GM/DL — SIGNIFICANT CHANGE UP (ref 32–36)
MCV RBC AUTO: 96 FL — SIGNIFICANT CHANGE UP (ref 80–100)
MONOCYTES # BLD AUTO: 0.52 K/UL — SIGNIFICANT CHANGE UP (ref 0–0.9)
MONOCYTES NFR BLD AUTO: 8.4 % — SIGNIFICANT CHANGE UP (ref 2–14)
NEUTROPHILS # BLD AUTO: 3.22 K/UL — SIGNIFICANT CHANGE UP (ref 1.8–7.4)
NEUTROPHILS NFR BLD AUTO: 51.7 % — SIGNIFICANT CHANGE UP (ref 43–77)
NRBC # BLD: 0 /100 WBCS — SIGNIFICANT CHANGE UP (ref 0–0)
NRBC # FLD: 0 K/UL — SIGNIFICANT CHANGE UP (ref 0–0)
PLATELET # BLD AUTO: 301 K/UL — SIGNIFICANT CHANGE UP (ref 150–400)
RBC # BLD: 4.52 M/UL — SIGNIFICANT CHANGE UP (ref 3.8–5.2)
RBC # FLD: 11.9 % — SIGNIFICANT CHANGE UP (ref 10.3–14.5)
WBC # BLD: 6.22 K/UL — SIGNIFICANT CHANGE UP (ref 3.8–10.5)
WBC # FLD AUTO: 6.22 K/UL — SIGNIFICANT CHANGE UP (ref 3.8–10.5)

## 2024-10-26 PROCEDURE — 99284 EMERGENCY DEPT VISIT MOD MDM: CPT

## 2024-10-26 RX ORDER — MAG HYDROX/ALUMINUM HYD/SIMETH 200-200-20
30 SUSPENSION, ORAL (FINAL DOSE FORM) ORAL ONCE
Refills: 0 | Status: COMPLETED | OUTPATIENT
Start: 2024-10-26 | End: 2024-10-26

## 2024-10-26 RX ORDER — SODIUM CHLORIDE 0.9 % (FLUSH) 0.9 %
1000 SYRINGE (ML) INJECTION ONCE
Refills: 0 | Status: COMPLETED | OUTPATIENT
Start: 2024-10-26 | End: 2024-10-26

## 2024-10-26 RX ORDER — FAMOTIDINE 40 MG
20 TABLET ORAL ONCE
Refills: 0 | Status: COMPLETED | OUTPATIENT
Start: 2024-10-26 | End: 2024-10-26

## 2024-10-26 RX ADMIN — Medication 20 MILLIGRAM(S): at 23:33

## 2024-10-26 RX ADMIN — Medication 30 MILLILITER(S): at 23:33

## 2024-10-26 RX ADMIN — Medication 1000 MILLILITER(S): at 23:33

## 2024-10-26 NOTE — ED ADULT NURSE NOTE - NSFALLUNIVINTERV_ED_ALL_ED
No
Bed/Stretcher in lowest position, wheels locked, appropriate side rails in place/Call bell, personal items and telephone in reach/Instruct patient to call for assistance before getting out of bed/chair/stretcher/Non-slip footwear applied when patient is off stretcher/[place] to call system/Physically safe environment - no spills, clutter or unnecessary equipment/Purposeful proactive rounding/Room/bathroom lighting operational, light cord in reach

## 2024-10-26 NOTE — ED ADULT NURSE NOTE - CHIEF COMPLAINT QUOTE
[facility].  Pt reports abd pain started yesterday morning.  Pt reports blood in stool is red.  Pt typically has 2 BMs a day and reports today she had trouble going.  LMP 10/15.  Pt denies N/V.  Pt denies medical history.

## 2024-10-26 NOTE — ED ADULT TRIAGE NOTE - CHIEF COMPLAINT QUOTE
[facility].  Pt reports abd pain started yesterday morning.  Pt reports blood in stool is red.  Pt typically has 2 BMs a day and reports today she had trouble going.  LMP 10/15.  Pt denies N/V.  Pt denies medical history.

## 2024-10-26 NOTE — ED ADULT NURSE NOTE - CAS DISCH TRANSFER METHOD
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  Estradiol level was still over 300.  She also is status post orchiectomy but desires vaginoplasty.  She did speak with [place] [facility] and was told that prior to having an appointment with one of her urologist she would need a letter from 2 different mental health providers.  She might be able to get a letter of support from a previous counselor that she had out of state.  She currently has another conference in [facility].  However, the list of providers that was provided to her none counselor is located in [place].  She has [facility] providers months.  I was able to talk with someone from the [facility].  They report that they usually have 2 different mental health providers from within the clinic provid.  E letters of support.  One from being the patient's counselor once reviewing the chart review.  To have a counselor review her outside records and offer a letter of support would still be likely a year long wait for the appointment.  I also spoke with one of the nurses from the [facility] urology department.  Advised that there are 2 urologist there that required orchiectomy for patients that are transgender.  Also for approximately the past 1 to 2 years they have a urologist with plastic surgeon that work together to perform vaginoplasty.  The nurse thought that the 2 letters of support can come from the patient's primary care person prescribing  hormone but this is different from the primary care provider for a mental health provider.  1 letter does need to come from a counselor.  Patient did not need to have the 2 letter sent for them before she can have the appointment to discuss the risk benefits of surgery. She does have a healthcare provider for me.       With no shortness of breath unsure if she has wheezing. She does have a smoker's cough in the morning but is occasionally productive. She has not been feeling ill at all. I did encourage tobacco cessation today. Reported that she will have better wound healing. Unfortunately during this pandemic she cannot be spirometry. We will give her a trial of albuterol with a spacer to see if this improves her shortness of breath.          Objective    General alert and oriented x3,    No acute distress    Lungs are clear to auscultation bilaterally with no wheezes rhonchi or rales    Cardiovascular regular rate and rhythm no murmurs gallops or rubs    Psych patient goal-directed thought process. Logical. She has good insight and good judgment mood is neutral affect is full.   Vitals & Measurements    HR: 86 (Peripheral)  BP: 110/60  SpO2: 98%  WT: 144.6 lb    Lab Results           Lab Results (Last 4 results within 90 days)           Sodium Level: 137 mmol/L [135 mmol/L - 146 mmol/L] (12/22/20 14:46:00)           Potassium Level: 4.1 mmol/L [3.5 mmol/L - 5.3 mmol/L] (12/22/20 14:46:00)           Chloride Level: 100 mmol/L [98 mmol/L - 110 mmol/L] (12/22/20 14:46:00)           CO2 Level: 30 mmol/L [20 mmol/L - 32 mmol/L] (12/22/20 14:46:00)           Glucose Level: 98 mg/dL [65 mg/dL - 99 mg/dL] (12/22/20 14:46:00)           BUN: 7 mg/dL [7 mg/dL - 25 mg/dL] (12/22/20 14:46:00)           Creatinine Level: 0.71 mg/dL [0.5 mg/dL - 1.1 mg/dL] (12/22/20 14:46:00)           BUN/Creat Ratio: NOT APPLICABLE [6 - 22] (12/22/20 14:46:00)           eGFR: 101 mL/min/1.73m2 (12/22/20 14:46:00)           eGFR   American: 118 mL/min/1.73m2 (12/22/20 14:46:00)           Calcium Level: 9.5 mg/dL [8.6 mg/dL - 10.2 mg/dL] (12/22/20 14:46:00)           Bilirubin Total: 0.3 mg/dL [0.2 mg/dL - 1.2 mg/dL] (01/26/21 14:30:00)           Bilirubin Total: 0.4 mg/dL [0.2 mg/dL - 1.2 mg/dL] (12/22/20 14:46:00)           Bilirubin Direct: 0.1 mg/dL (01/26/21 14:30:00)           Bilirubin Indirect: 0.2 [0.2  - 1.2] (01/26/21 14:30:00)           Alkaline Phosphatase: 55 unit/L [31 unit/L - 125 unit/L] (01/26/21 14:30:00)           Alkaline Phosphatase: 56 unit/L [31 unit/L - 125 unit/L] (12/22/20 14:46:00)           AST/SGOT: 12 unit/L [10 unit/L - 35 unit/L] (01/26/21 14:30:00)           AST/SGOT: 15 unit/L [10 unit/L - 35 unit/L] (12/22/20 14:46:00)           ALT/SGPT: 7 unit/L [6 unit/L - 29 unit/L] (01/26/21 14:30:00)           ALT/SGPT: 9 unit/L [6 unit/L - 29 unit/L] (12/22/20 14:46:00)           Protein Total: 6.9 gm/dL [6.1 gm/dL - 8.1 gm/dL] (01/26/21 14:30:00)           Protein Total: 6.6 gm/dL [6.1 gm/dL - 8.1 gm/dL] (12/22/20 14:46:00)           Albumin Level: 4.3 gm/dL [3.6 gm/dL - 5.1 gm/dL] (01/26/21 14:30:00)           Albumin Level: 4.3 gm/dL [3.6 gm/dL - 5.1 gm/dL] (12/29/20 14:13:00)           Albumin Level: 4.3 gm/dL [3.6 gm/dL - 5.1 gm/dL] (12/22/20 14:46:00)           Globulin: 2.6 [1.9  - 3.7] (01/26/21 14:30:00)           Globulin: 2.3 [1.9  - 3.7] (12/22/20 14:46:00)           A/G Ratio: 1.7 [1  - 2.5] (01/26/21 14:30:00)           A/G Ratio: 1.9 [1  - 2.5] (12/22/20 14:46:00)           Cholesterol: 148 mg/dL (12/22/20 14:46:00)           Non-HDL Cholesterol: 105 (12/22/20 14:46:00)           HDL: 43 mg/dL Low (12/22/20 14:46:00)           Cholesterol/HDL Ratio: 3.4 (12/22/20 14:46:00)           LDL: 85 (12/22/20 14:46:00)           Triglyceride: 103 mg/dL (12/22/20 14:46:00)           Estradiol Level: 317 pg/mL (01/26/21 14:30:00)           Estradiol Level: 392 pg/mL High (01/20/21 12:59:00)           Estradiol  Level: 545 pg/mL High (01/12/21 13:49:00)           Estradiol Level: 1106 pg/mL High (12/29/20 14:13:00)           Prolactin Level: 9.1 ng/mL (12/29/20 14:13:00)           Sex Hormone Binding Globulin (SHBG): 141 nmol/L High [17 nmol/L - 124 nmol/L] (12/29/20 14:13:00)           Testosterone Total: 11 ng/dL [2 ng/dL - 45 ng/dL] (12/29/20 14:13:00)           Testosterone Total: 12 ng/dL [2 ng/dL - 45 ng/dL] (12/22/20 14:46:00)           Testosterone Bioavail: 0.7 ng/dL [0.5 ng/dL - 8.5 ng/dL] (12/29/20 14:13:00)           Testosterone Free: 0.4 pg/mL [0.2 pg/mL - 5 pg/mL] (12/29/20 14:13:00)           Testosterone Free: 0.5 pg/mL [0.1 pg/mL - 6.4 pg/mL] (12/22/20 14:46:00)  Assessment/Plan       Shortness of breath (R06.02)         concern for COPD, tria albuterol, encouraged tobacco cessation         Ordered:          albuterol, 2 puff(s), INH, QID, PRN: as needed for wheezing, # 1 EA, 1 Refill(s), Type: Maintenance, Pharmacy: [id number], 2 puff(s) Inhale qid,PRN:as needed for wheezing, 67.5, in, 01/12/21 13:12:00 CST, Height Measured, 144.6, lb, 01/26/21 13:..., (Completed)          Miscellaneous Prescription, valved holding chamber spacer, See Instructions, Instructions: use with albuterol, Supply, # 1 EA, 0 Refill(s), Type: Maintenance, Pharmacy: [facility] #[id number], use with albuterol, 67.5, in, 01/12/21 13:12:00 CST, Height Measured, 144.6, lb,..., (Ordered)                Transsexualism (F64.0)         will resume estradiol injections one her estradiol is below 200, we have cut the dose in half  and are having her do the injection every other week not weekly         Ordered:          Estradiol* ([facility]), Specimen Type: Serum, Collection Date: 01/26/21 14:04:00 CST          Hepatic Function Panel* ([facility]), Specimen Type: Serum, Collection Date: 01/26/21 14:04:00 CST          Referral (Request), 01/26/21 13:49:00 CST, Referred to: Urology, Referred to: Dr. Lucero with [facility]  Urology, Transsexualism                Orders:         albuterol, 2 puff(s), Inhale, qid, PRN: for wheezing, # 3 EA, 0 Refill(s), Type: Maintenance, Pharmacy: [facility] #[id number], 2 puff(s) Inhale qid,PRN:for wheezing, 67.5, in, 01/12/21 13:12:00 CST, Height Measured, 144.6, lb, 01/26/21 13:08:00 CST, Weight..., (Ordered)         Return to Clinic (Request), RFV: Return in 2 weeks to recheck estradiol levels, lab only visit       Concerned that patient may have some element of COPD. Did spend 3 minutes today discussing importance of tobacco cessation. Suggested she try to be mindful of triggers and try to reduce the amount of cigarettes that she smokes each time she likes up. We will try some albuterol with spacer and see if this improves any of her shortness of breath.       prep time 10 min       room time 26 min in addition to time spent counseling for tobacco cessation       tobacco cessation 3 min       chart time 22 min  spoke with pt estradiol still too high to resume depo estrogen   Private car

## 2024-10-26 NOTE — ED ADULT NURSE NOTE - OBJECTIVE STATEMENT
Pt received to intake 4, A&Ox4, ambulatory. Pt presenting to the ED today complaining of epigastric pain since yesterday. Pt endorsing episodes of diarrhea yesterday with some blood in the stool. Pt denies c/p, SOB, headache, blurry vision, n/v, or fever like symptoms. Respirations even and unlabored. 20G IV placed in the right AC. Pt medicated as per MD orders. Labs drawn and sent. Comfort measures provided, safety maintained. Plan of care ongoing.

## 2024-10-27 VITALS
HEART RATE: 68 BPM | SYSTOLIC BLOOD PRESSURE: 150 MMHG | OXYGEN SATURATION: 99 % | RESPIRATION RATE: 15 BRPM | DIASTOLIC BLOOD PRESSURE: 88 MMHG | TEMPERATURE: 98 F

## 2024-10-27 LAB
ALBUMIN SERPL ELPH-MCNC: 4.1 G/DL — SIGNIFICANT CHANGE UP (ref 3.3–5)
ALP SERPL-CCNC: 82 U/L — SIGNIFICANT CHANGE UP (ref 40–120)
ALT FLD-CCNC: 13 U/L — SIGNIFICANT CHANGE UP (ref 4–33)
ANION GAP SERPL CALC-SCNC: 13 MMOL/L — SIGNIFICANT CHANGE UP (ref 7–14)
APPEARANCE UR: CLEAR — SIGNIFICANT CHANGE UP
AST SERPL-CCNC: 35 U/L — HIGH (ref 4–32)
BILIRUB SERPL-MCNC: 0.3 MG/DL — SIGNIFICANT CHANGE UP (ref 0.2–1.2)
BILIRUB UR-MCNC: NEGATIVE — SIGNIFICANT CHANGE UP
BUN SERPL-MCNC: 11 MG/DL — SIGNIFICANT CHANGE UP (ref 7–23)
CALCIUM SERPL-MCNC: 9.2 MG/DL — SIGNIFICANT CHANGE UP (ref 8.4–10.5)
CHLORIDE SERPL-SCNC: 100 MMOL/L — SIGNIFICANT CHANGE UP (ref 98–107)
CO2 SERPL-SCNC: 23 MMOL/L — SIGNIFICANT CHANGE UP (ref 22–31)
COLOR SPEC: YELLOW — SIGNIFICANT CHANGE UP
CREAT SERPL-MCNC: 0.75 MG/DL — SIGNIFICANT CHANGE UP (ref 0.5–1.3)
DIFF PNL FLD: NEGATIVE — SIGNIFICANT CHANGE UP
EGFR: 101 ML/MIN/1.73M2 — SIGNIFICANT CHANGE UP
GLUCOSE SERPL-MCNC: 89 MG/DL — SIGNIFICANT CHANGE UP (ref 70–99)
GLUCOSE UR QL: NEGATIVE MG/DL — SIGNIFICANT CHANGE UP
HCG SERPL-ACNC: <1 MIU/ML — SIGNIFICANT CHANGE UP
KETONES UR-MCNC: ABNORMAL MG/DL
LEUKOCYTE ESTERASE UR-ACNC: NEGATIVE — SIGNIFICANT CHANGE UP
LIDOCAIN IGE QN: 36 U/L — SIGNIFICANT CHANGE UP (ref 7–60)
NITRITE UR-MCNC: NEGATIVE — SIGNIFICANT CHANGE UP
PH UR: 6.5 — SIGNIFICANT CHANGE UP (ref 5–8)
POTASSIUM SERPL-MCNC: 5.4 MMOL/L — HIGH (ref 3.5–5.3)
POTASSIUM SERPL-SCNC: 5.4 MMOL/L — HIGH (ref 3.5–5.3)
PROT SERPL-MCNC: 7.7 G/DL — SIGNIFICANT CHANGE UP (ref 6–8.3)
PROT UR-MCNC: NEGATIVE MG/DL — SIGNIFICANT CHANGE UP
SODIUM SERPL-SCNC: 136 MMOL/L — SIGNIFICANT CHANGE UP (ref 135–145)
SP GR SPEC: 1.01 — SIGNIFICANT CHANGE UP (ref 1–1.03)
TSH SERPL-MCNC: 2.05 UIU/ML — SIGNIFICANT CHANGE UP (ref 0.27–4.2)
UROBILINOGEN FLD QL: 0.2 MG/DL — SIGNIFICANT CHANGE UP (ref 0.2–1)

## 2024-10-27 PROCEDURE — 74177 CT ABD & PELVIS W/CONTRAST: CPT | Mod: 26,MC

## 2024-10-27 NOTE — ED PROVIDER NOTE - CLINICAL SUMMARY MEDICAL DECISION MAKING FREE TEXT BOX
This is a 37-year-old female with a past medical history of GERD coming in with lower abdominal pain since yesterday.   She states that she had 1 episode of streaks bright red blood in her toilet yesterday and today. Bloody stools- will do labs, fluids, ctap and reassess.

## 2024-10-27 NOTE — ED PROVIDER NOTE - INTERNATIONAL TRAVEL
Prophylactic measure
2019 novel coronavirus disease (COVID-19)
Prophylactic measure
2019 novel coronavirus disease (COVID-19)
No
Prophylactic measure
2019 novel coronavirus disease (COVID-19)

## 2024-10-27 NOTE — ED PROVIDER NOTE - PROGRESS NOTE DETAILS
LUCI Cormier: I had a detailed discussion with the patient and/or guardian regarding the historical points, exam findings, and any diagnostic results supporting the discharge diagnosis. Results endorsed including unexpected incidental findings (copy of reports provided to patient).   Pt educated on care and need for follow up. Strict return instructions and red flag signs and symptoms discussed with patient. Questions answered. Pt shows understanding of discharge information and agrees to follow. Pt medically stable for discharge.  Strict return precautions given.  Pt to follow up with PMD and gastroenterology.  Pt tolerating PO.  Pt without any recurrences of brbpr.

## 2024-10-27 NOTE — ED PROVIDER NOTE - NSFOLLOWUPINSTRUCTIONS_ED_ALL_ED_FT
Advance activity as tolerated.  Continue all previously prescribed medications as directed unless otherwise instructed.  Take Ciprofloxacin 500 mg one pill twice a day for 10 days.  Take Flagyl 500 mg one pill three times a day for 10 days -- do no consume alcohol when taking this medication.  Follow up with your primary care physician and gastroenterology (referral list provided) in 48-72 hours- bring copies of your results.  Return to the Emergency Department for worsening or persistent symptoms, including but not limited to worsening/persistent pain, fevers, vomiting, chest pain, black or bloody stools, fevers, passing out, lightheadedness, dizziness, inability to pass bowel movements or gas, abdominal distension/swelling, and/or ANY NEW OR CONCERNING SYMPTOMS. If you have issues obtaining follow up, please call: [phone] to obtain a doctor or specialist who takes your insurance in your area.  You may call [phone] to make an appointment with the internal medicine clinic.     COLITIS - AfterCare(R) Instructions(ER/ED)    Colitis    WHAT YOU NEED TO KNOW:    Colitis is swelling and irritation of your colon. Colitis may be caused by ulcers or a problem with your immune system. Bacteria, a virus, or a parasite may also cause colitis. The cause may not be known. You may have diarrhea, abdominal pain, fever, or blood or mucus in your bowel movement.    DISCHARGE INSTRUCTIONS:    Return to the emergency department if:    You have sudden trouble breathing.    Your bowel movements are black or have blood in them.    You have blood in your vomit.    You have severe abdominal pain or your abdomen is swollen and feels hard.    You have any of the following signs of dehydration:  Dizziness or weakness    Dry mouth, cracked lips, or severe thirst    Fast heartbeat or breathing    Urinating very little or not at all  Call your doctor if:    Your symptoms get worse or do not go away.    You have a fever, chills, cough, or feel weak and achy.    You suddenly lose weight without trying.    You have questions or concerns about your condition or care.  Medicines:    Medicines may be given to decrease inflammation in your colon and treat diarrhea.    Take your medicine as directed. Contact your healthcare provider if you think your medicine is not helping or if you have side effects. Tell your provider if you are allergic to any medicine. Keep a list of the medicines, vitamins, and herbs you take. Include the amounts, and when and why you take them. Bring the list or the pill bottles to follow-up visits. Carry your medicine list with you in case of an emergency.  Manage your symptoms:    Drink liquids as directed to help prevent dehydration. Good liquids to drink include water, juice, and broth. Ask how much liquid to drink each day. You may need to drink an oral rehydration solution (ORS). An ORS contains a balance of water, salt, and sugar to replace body fluids lost during diarrhea.    Eat a variety of healthy foods. Healthy foods include fruits, vegetables, whole-grain breads, beans, low-fat dairy products, lean meats, and fish. You may need to eat several small meals throughout the day instead of large meals. Avoid spicy foods, caffeine, chocolate, and foods high in fat.    Talk to your healthcare provider before you take NSAIDs. NSAIDs can cause worsen your symptoms if ulcers are causing your colitis.    Start to exercise when you feel better. Regular exercise helps your bowels work normally. Ask about the best exercise plan for you.  Prevent the spread of germs:      Wash your hands often. Wash your hands several times each day. Wash after you use the bathroom, change a child's diaper, and before you prepare or eat food. Use soap and water every time. Rub your soapy hands together, lacing your fingers. Wash the front and back of your hands, and in between your fingers. Use the fingers of one hand to scrub under the fingernails of the other hand. Wash for at least 20 seconds. Rinse with warm, running water for several seconds. Then dry your hands with a clean towel or paper towel. Use hand  that contains alcohol if soap and water are not available. Do not touch your eyes, nose, or mouth without washing your hands first.  Handwashing      Cover a sneeze or cough. Use a tissue that covers your mouth and nose. Throw the tissue away in a trash can right away. Use the bend of your arm if a tissue is not available. Wash your hands well with soap and water or use a hand .    Clean surfaces often. Clean doorknobs, countertops, cell phones, and other surfaces that are touched often. Use a disinfecting wipe, a single-use sponge, or a cloth you can wash and reuse. Use disinfecting  if you do not have wipes. You can create a disinfecting  by mixing 1 part bleach with 10 parts water.    Ask about vaccines you may need. Vaccines help prevent disease caused by some viruses and bacteria. Get the influenza (flu) vaccine as soon as recommended each year. The flu vaccine is usually available starting in September or October. Flu viruses change, so it is important to get a flu vaccine every year. Get the pneumonia vaccine if recommended. This vaccine is usually recommended every 5 years. Your provider will tell you when to get this vaccine, if needed. Your healthcare provider can tell you if you should get other vaccines, and when to get them.  Follow up with your doctor as directed: You may need to return for a colonoscopy or other tests. Write down how often you have a bowel movements and what they look like. Bring this to your follow-up visits. Write down your questions so you remember to ask them during your visits.    © [employer] 1973, 2023

## 2024-10-27 NOTE — ED PROVIDER NOTE - PATIENT PORTAL LINK FT
You can access the FollowMyHealth Patient Portal offered by [facility] by registering at the following website: [email or web address]. By joining [facility]’s FollowMyHealth portal, you will also be able to view your health information using other applications (apps) compatible with our system.

## 2024-10-27 NOTE — ED PROVIDER NOTE - OBJECTIVE STATEMENT
This is a 37-year-old female with a past medical history of GERD coming in with lower abdominal pain since yesterday.   She states that she had 1 episode of streaks bright red blood in her toilet yesterday and today.  She states that she ate [employer] yesterday and was having some discomfort in her abdomen afterwards.  She currently denies having any episodes of nausea or vomiting and states that she is able to tolerate p.o.  She denies having any fever chills or bodyaches she denies having any chest pain or exertional dyspnea she denies having dysuria hematuria urinary urgency or frequency.  She states that she has not had a episode of blood in her stool since this morning.

## 2024-11-04 ENCOUNTER — APPOINTMENT (OUTPATIENT)
Dept: OBGYN | Facility: CLINIC | Age: 44
End: 2024-11-04

## 2025-05-21 ENCOUNTER — APPOINTMENT (OUTPATIENT)
Dept: OBGYN | Facility: CLINIC | Age: 45
End: 2025-05-21